# Patient Record
Sex: MALE | Race: ASIAN | NOT HISPANIC OR LATINO | URBAN - METROPOLITAN AREA
[De-identification: names, ages, dates, MRNs, and addresses within clinical notes are randomized per-mention and may not be internally consistent; named-entity substitution may affect disease eponyms.]

---

## 2023-10-12 ENCOUNTER — EMERGENCY (EMERGENCY)
Facility: HOSPITAL | Age: 68
LOS: 1 days | Discharge: HOME | End: 2023-10-12
Attending: STUDENT IN AN ORGANIZED HEALTH CARE EDUCATION/TRAINING PROGRAM
Payer: COMMERCIAL

## 2023-10-12 VITALS
TEMPERATURE: 99 F | HEIGHT: 67 IN | RESPIRATION RATE: 19 BRPM | WEIGHT: 125 LBS | SYSTOLIC BLOOD PRESSURE: 125 MMHG | DIASTOLIC BLOOD PRESSURE: 80 MMHG | HEART RATE: 89 BPM | OXYGEN SATURATION: 96 %

## 2023-10-12 LAB
ALBUMIN SERPL ELPH-MCNC: 4.3 G/DL — SIGNIFICANT CHANGE UP (ref 3.3–5)
ALP SERPL-CCNC: 54 U/L — SIGNIFICANT CHANGE UP (ref 40–120)
ALT FLD-CCNC: 24 U/L — SIGNIFICANT CHANGE UP (ref 10–45)
ANION GAP SERPL CALC-SCNC: 11 MMOL/L — SIGNIFICANT CHANGE UP (ref 5–17)
APTT BLD: 30.3 SEC — SIGNIFICANT CHANGE UP (ref 24.5–35.6)
AST SERPL-CCNC: 19 U/L — SIGNIFICANT CHANGE UP (ref 10–40)
BASOPHILS # BLD AUTO: 0.04 K/UL — SIGNIFICANT CHANGE UP (ref 0–0.2)
BASOPHILS NFR BLD AUTO: 0.8 % — SIGNIFICANT CHANGE UP (ref 0–2)
BILIRUB SERPL-MCNC: 0.3 MG/DL — SIGNIFICANT CHANGE UP (ref 0.2–1.2)
BUN SERPL-MCNC: 18 MG/DL — SIGNIFICANT CHANGE UP (ref 7–23)
CALCIUM SERPL-MCNC: 9.2 MG/DL — SIGNIFICANT CHANGE UP (ref 8.4–10.5)
CHLORIDE SERPL-SCNC: 107 MMOL/L — SIGNIFICANT CHANGE UP (ref 96–108)
CO2 SERPL-SCNC: 23 MMOL/L — SIGNIFICANT CHANGE UP (ref 22–31)
CREAT SERPL-MCNC: 0.84 MG/DL — SIGNIFICANT CHANGE UP (ref 0.5–1.3)
EGFR: 95 ML/MIN/1.73M2 — SIGNIFICANT CHANGE UP
EOSINOPHIL # BLD AUTO: 0.16 K/UL — SIGNIFICANT CHANGE UP (ref 0–0.5)
EOSINOPHIL NFR BLD AUTO: 3.1 % — SIGNIFICANT CHANGE UP (ref 0–6)
GLUCOSE SERPL-MCNC: 105 MG/DL — HIGH (ref 70–99)
HCT VFR BLD CALC: 42.8 % — SIGNIFICANT CHANGE UP (ref 39–50)
HGB BLD-MCNC: 14.4 G/DL — SIGNIFICANT CHANGE UP (ref 13–17)
IMM GRANULOCYTES NFR BLD AUTO: 0.2 % — SIGNIFICANT CHANGE UP (ref 0–0.9)
INR BLD: 1 RATIO — SIGNIFICANT CHANGE UP (ref 0.85–1.18)
LYMPHOCYTES # BLD AUTO: 1.77 K/UL — SIGNIFICANT CHANGE UP (ref 1–3.3)
LYMPHOCYTES # BLD AUTO: 34.8 % — SIGNIFICANT CHANGE UP (ref 13–44)
MCHC RBC-ENTMCNC: 31 PG — SIGNIFICANT CHANGE UP (ref 27–34)
MCHC RBC-ENTMCNC: 33.6 GM/DL — SIGNIFICANT CHANGE UP (ref 32–36)
MCV RBC AUTO: 92.2 FL — SIGNIFICANT CHANGE UP (ref 80–100)
MONOCYTES # BLD AUTO: 0.46 K/UL — SIGNIFICANT CHANGE UP (ref 0–0.9)
MONOCYTES NFR BLD AUTO: 9.1 % — SIGNIFICANT CHANGE UP (ref 2–14)
NEUTROPHILS # BLD AUTO: 2.64 K/UL — SIGNIFICANT CHANGE UP (ref 1.8–7.4)
NEUTROPHILS NFR BLD AUTO: 52 % — SIGNIFICANT CHANGE UP (ref 43–77)
NRBC # BLD: 0 /100 WBCS — SIGNIFICANT CHANGE UP (ref 0–0)
NT-PROBNP SERPL-SCNC: <36 PG/ML — SIGNIFICANT CHANGE UP (ref 0–300)
PLATELET # BLD AUTO: 190 K/UL — SIGNIFICANT CHANGE UP (ref 150–400)
POTASSIUM SERPL-MCNC: 3.8 MMOL/L — SIGNIFICANT CHANGE UP (ref 3.5–5.3)
POTASSIUM SERPL-SCNC: 3.8 MMOL/L — SIGNIFICANT CHANGE UP (ref 3.5–5.3)
PROT SERPL-MCNC: 7 G/DL — SIGNIFICANT CHANGE UP (ref 6–8.3)
PROTHROM AB SERPL-ACNC: 10.5 SEC — SIGNIFICANT CHANGE UP (ref 9.5–13)
RBC # BLD: 4.64 M/UL — SIGNIFICANT CHANGE UP (ref 4.2–5.8)
RBC # FLD: 13.6 % — SIGNIFICANT CHANGE UP (ref 10.3–14.5)
SODIUM SERPL-SCNC: 141 MMOL/L — SIGNIFICANT CHANGE UP (ref 135–145)
TROPONIN T, HIGH SENSITIVITY RESULT: 13 NG/L — SIGNIFICANT CHANGE UP (ref 0–51)
WBC # BLD: 5.08 K/UL — SIGNIFICANT CHANGE UP (ref 3.8–10.5)
WBC # FLD AUTO: 5.08 K/UL — SIGNIFICANT CHANGE UP (ref 3.8–10.5)

## 2023-10-12 PROCEDURE — 99285 EMERGENCY DEPT VISIT HI MDM: CPT

## 2023-10-12 RX ORDER — SODIUM CHLORIDE 9 MG/ML
1000 INJECTION, SOLUTION INTRAVENOUS ONCE
Refills: 0 | Status: COMPLETED | OUTPATIENT
Start: 2023-10-12 | End: 2023-10-12

## 2023-10-12 RX ADMIN — SODIUM CHLORIDE 1000 MILLILITER(S): 9 INJECTION, SOLUTION INTRAVENOUS at 23:57

## 2023-10-12 NOTE — ED PROVIDER NOTE - NEUROLOGICAL SENSATION DIMINISHED
Sensation to deep and light palpation diminished in distal extremities/left upper extremity/left lower extremity/right upper extremity/right lower extremity

## 2023-10-12 NOTE — ED PROVIDER NOTE - RAPID ASSESSMENT
68-year-old male who was recently diagnosed with vertigo outpatient, he with son complaining of 2 days of bilateral distal extremity paresthesias.  As per patient's son Cheo Lloyd, patient has had approximately 2 weeks of room spinning dizziness intermittently occurring associated with change in position, some associated nausea with vomiting.  Patient was evaluated outpatient had carotid duplex and reportedly had MRI brain on 10 9, uncertain of results.  Patient was seen outpatient and as per son told there were potentially "rocks in the ear" and was prescribed meclizine and ondansetron.  2 days ago patient began developing paresthesias distal to the elbows bilaterally and distal to the knees bilaterally.  Patient denies visual change, chest pain, shortness of breath, abdominal pain, weakness.    Patient was seen as a rapid assessment (QPA) patient. The patient will be seen and further worked up in the main emergency department and their care will be completed by the main emergency department team along with a thorough physical exam. Receiving team will follow up on labs, analgesia, any clinical imaging, reassess and disposition as clinically indicated, all decisions regarding the progression of care will be made at their discretion. - Wyatt Headley PA-C

## 2023-10-12 NOTE — ED ADULT TRIAGE NOTE - WEIGHT IN KG
Lm for pt (Praveen Gomez per HIPAA) to further discuss sx. Noted pt in ER. To call back at the office to schedule ER f/u or if any further questions. 56.7

## 2023-10-12 NOTE — ED PROVIDER NOTE - PROGRESS NOTE DETAILS
Neuro evaluation of patient demonstrated bilateral distal paresthesias when sitting up from a resting position. DTR normal across all extremities, no focal deficits noted on neurological exam and strength and coordination intact. Neurology consulted for further assessment of neurological status of patient. Neurology recommended outpatient follow-up. Sent outpatient labs for patient follow-up. Reassessed patient neurological status, unchanged since admission. Patient able to ambulate unassisted with normal gait and normal coordination.

## 2023-10-12 NOTE — ED PROVIDER NOTE - CLINICAL SUMMARY MEDICAL DECISION MAKING FREE TEXT BOX
Patient is a 68 year old man with a history of vertigo presenting with bilateral distal paresthesias on the upper and lower extremities.    DDx includes diabetic neuropathy, stroke, or sensory variant GBS. CBC, CMP, and VBG sent to screen for baseline metabolic and acid/base status. CT head and CTA head and neck sent. Labs and CTs were returned wnl. Neurology was consulted and did not identify any acute pathology requiring emergent intervention and recommended outpatient follow-up. Sent screening labs per neurology recommendations.

## 2023-10-12 NOTE — ED PROVIDER NOTE - OBJECTIVE STATEMENT
The patient is 67yo man presenting with 3 days of bilateral distal extremity paresthesias. The patient is Mandarin speaking and his son, Cheo Lloyd, translated at bedside. The patient was recently diagnosed with vertigo in the outpatient setting and has described two weeks of intermittent dizziness from the room spinning, usually associated with a change in position. This sometimes induced nausea and vomiting. The patient received a carotid duplex with MRI of the brain on 10/9 at [facility] in [place], results unavailable at this time. The patient has been prescribed meclizine and ondansetron to manage his symptoms, but the dizziness is still ongoing. Per the patient's son, 3 days ago the patient began to experience bilateral paresthesias in the upper extremities distal to the elbow and in the lower extremities distal to the knees. The patient notices these changes mostly once he sits up from a laying position.    Patient denied fevers/chills, nausea/vomiting, diarrhea/constipation, changes in vision, chest pain, shortness of breath, abdominal pain, and weakness.

## 2023-10-12 NOTE — ED PROVIDER NOTE - NSFOLLOWUPINSTRUCTIONS_ED_ALL_ED_FT
You visited the hospital due to numbness in your arms and legs. Blood tests were drawn and imaging was collected focused on your nervous system. All of the tests did not show anything acutely wrong with your nervous system. You have been cleared for discharge at this time.    Please follow up with the general [facility] Neurology clinic within 1-2 weeks. The contact information is included below.    Please continue to take your meclizine and ondansetron as prescribed for your ongoing dizziness and follow-up with the Neurology clinic.    See below for information on paresthesias (numbness):    Paresthesia  Paresthesia is a burning or prickling feeling. This feeling can happen in any part of the body. It often happens in the hands, arms, legs, or feet. Usually, it is not painful. In most cases, the feeling goes away in a short time and is not a sign of a serious problem. If you have paresthesia that lasts a long time, you need to see your doctor.    General instructions  Take over-the-counter and prescription medicines only as told by your doctor.  Do not smoke or use any products that contain nicotine or tobacco. If you need help quitting, ask your doctor.  If you have diabetes, work with your doctor to make sure your blood sugar stays in a healthy range.  If your feet feel numb:  Check for redness, warmth, and swelling every day.  Wear padded socks and comfortable shoes. These help protect your feet.  Keep all follow-up visits.    Contact a doctor if:  You have paresthesia that gets worse or does not go away.  You lose feeling (have numbness) after an injury.  Your burning or prickling feeling gets worse when you walk.  You have pain or cramps.  You feel dizzy or you faint.  You have a rash.    Get help right away if:  You feel weak or have new weakness in an arm or leg.  You have trouble walking or moving.  You have problems speaking, understanding, or seeing.  You feel confused.  You cannot control when you pee (urinate) or poop (have a bowel movement).  These symptoms may be an emergency. Get help right away. Call 911.  Do not wait to see if the symptoms will go away.  Do not drive yourself to the hospital.    Summary  Paresthesia is a burning or prickling feeling. It often happens in the hands, arms, legs, or feet.  In most cases, the feeling goes away in a short time and is not a sign of a serious problem.  If you have paresthesia that lasts a long time, you need to be seen by your doctor.

## 2023-10-12 NOTE — ED PROVIDER NOTE - ATTENDING CONTRIBUTION TO CARE
Attending (Tyrese Zuñiga D.O.):  I have personally seen and examined this patient. I have performed a substantive portion of the visit including all aspects of the medical decision making. Resident, fellow, student, and/or ACP note reviewed. I agree on the plan of care except where noted.    68-year-old male with a history of vertigo for the last 2 weeks positional worse with head movements here for distal extremity, bilateral, paresthesias when he sitting up or walking.  Patient states when he sits up he gets from sitting position that improves.  Tongue ENT who told him this was vertigo.  Patient had a brain MRI done at [facility] on October 9, 2023 where he does not have results of yet.  He was told that he has "Roxicet years" by the PCP and that was causing his symptoms.  Patient not taking aspirin or diuretics at this time.  States had COVID recently.  Denies any recent GI illness.  Denies fever, chills.  Still tolerating p.o.  Still able to ambulate without change in strength.  No speech deficits.  Here patient medically stable no acute distress. Patient aaox4 to person, place, time, event. CNs intact w/ symm facies, PERRL 3mm, EOMI w/o nystagmus, normal phonation. 5/5 str all 4 extrem w/ intact sensation to touch however dec when patient sits up in bilateral forearms and shins. 2+ patellar reflex and achilles reflex bilateral. Well coordinated. No drift. Steady gait. 2+ distal pulses, equal b/l. Patient exam with low suspicion of sensory variant of GBS.  Doubt this is primary neurovascular stroke in etiology given recent MRI.  Patient not on medications predisposing to bleeding at this time.  Not on ASA or diuretics that would increase risk of vertigo.  No ear ringing suggest Ménière's. Plan for labs, CTA head and neck, neuro eval -> neuro does not think this is GBS at this time. Will order addl labs and have patient f/u in office with them. All discussed with family. strict return precautions given with verbal understanding expressed. Stable for dc with outpt f/u

## 2023-10-12 NOTE — ED PROVIDER NOTE - NSFOLLOWUPCLINICS_GEN_ALL_ED_FT
[facility]  Neurology  [address] [place] [address] [address]rd [place] [address]  Phone: [phone]  Fax:   Follow Up Time: 7-10 Days

## 2023-10-12 NOTE — ED PROVIDER NOTE - PATIENT PORTAL LINK FT
You can access the FollowMyHealth Patient Portal offered by [facility] by registering at the following website: [email or web address]. By joining [facility]’s FollowMyHealth portal, you will also be able to view your health information using other applications (apps) compatible with our system.

## 2023-10-12 NOTE — ED ADULT TRIAGE NOTE - CHIEF COMPLAINT QUOTE
dizziness and vomiting x 2 wks  exacerbated when getting up, describes as room spinning.   On meclizine and ondansetron.   Received US doppler of carotid arteries and MRI of brain on 10/9 but did not receive results.

## 2023-10-13 VITALS
RESPIRATION RATE: 19 BRPM | SYSTOLIC BLOOD PRESSURE: 118 MMHG | OXYGEN SATURATION: 100 % | DIASTOLIC BLOOD PRESSURE: 80 MMHG | HEART RATE: 70 BPM

## 2023-10-13 LAB
BASE EXCESS BLDV CALC-SCNC: 5 MMOL/L — HIGH (ref -2–3)
CA-I SERPL-SCNC: 1.23 MMOL/L — SIGNIFICANT CHANGE UP (ref 1.15–1.33)
CHLORIDE BLDV-SCNC: 107 MMOL/L — SIGNIFICANT CHANGE UP (ref 96–108)
CO2 BLDV-SCNC: 33 MMOL/L — HIGH (ref 22–26)
CRP SERPL-MCNC: <3 MG/L — SIGNIFICANT CHANGE UP (ref 0–4)
ERYTHROCYTE [SEDIMENTATION RATE] IN BLOOD: 8 MM/HR — SIGNIFICANT CHANGE UP (ref 0–20)
FOLATE SERPL-MCNC: 8.6 NG/ML — SIGNIFICANT CHANGE UP
GAS PNL BLDV: 138 MMOL/L — SIGNIFICANT CHANGE UP (ref 136–145)
GAS PNL BLDV: SIGNIFICANT CHANGE UP
GLUCOSE BLDV-MCNC: 96 MG/DL — SIGNIFICANT CHANGE UP (ref 70–99)
HCO3 BLDV-SCNC: 31 MMOL/L — HIGH (ref 22–29)
HCT VFR BLDA CALC: 39 % — SIGNIFICANT CHANGE UP (ref 39–51)
HGB BLD CALC-MCNC: 13.1 G/DL — SIGNIFICANT CHANGE UP (ref 12.6–17.4)
LACTATE BLDV-MCNC: 1.4 MMOL/L — SIGNIFICANT CHANGE UP (ref 0.5–2)
PCO2 BLDV: 53 MMHG — SIGNIFICANT CHANGE UP (ref 42–55)
PH BLDV: 7.38 — SIGNIFICANT CHANGE UP (ref 7.32–7.43)
PO2 BLDV: 29 MMHG — SIGNIFICANT CHANGE UP (ref 25–45)
POTASSIUM BLDV-SCNC: 4.3 MMOL/L — SIGNIFICANT CHANGE UP (ref 3.5–5.1)
SAO2 % BLDV: 42.4 % — LOW (ref 67–88)
T PALLIDUM AB TITR SER: NEGATIVE — SIGNIFICANT CHANGE UP
T4 FREE+ TSH PNL SERPL: 2.4 UIU/ML — SIGNIFICANT CHANGE UP (ref 0.27–4.2)
TROPONIN T, HIGH SENSITIVITY RESULT: 9 NG/L — SIGNIFICANT CHANGE UP (ref 0–51)
VIT B12 SERPL-MCNC: 359 PG/ML — SIGNIFICANT CHANGE UP (ref 232–1245)

## 2023-10-13 PROCEDURE — 70498 CT ANGIOGRAPHY NECK: CPT | Mod: MA

## 2023-10-13 PROCEDURE — 84132 ASSAY OF SERUM POTASSIUM: CPT

## 2023-10-13 PROCEDURE — 85730 THROMBOPLASTIN TIME PARTIAL: CPT

## 2023-10-13 PROCEDURE — 83921 ORGANIC ACID SINGLE QUANT: CPT

## 2023-10-13 PROCEDURE — 84443 ASSAY THYROID STIM HORMONE: CPT

## 2023-10-13 PROCEDURE — 82947 ASSAY GLUCOSE BLOOD QUANT: CPT

## 2023-10-13 PROCEDURE — 86140 C-REACTIVE PROTEIN: CPT

## 2023-10-13 PROCEDURE — 83605 ASSAY OF LACTIC ACID: CPT

## 2023-10-13 PROCEDURE — 82330 ASSAY OF CALCIUM: CPT

## 2023-10-13 PROCEDURE — 82435 ASSAY OF BLOOD CHLORIDE: CPT

## 2023-10-13 PROCEDURE — 96360 HYDRATION IV INFUSION INIT: CPT | Mod: XU

## 2023-10-13 PROCEDURE — 84425 ASSAY OF VITAMIN B-1: CPT

## 2023-10-13 PROCEDURE — 85652 RBC SED RATE AUTOMATED: CPT

## 2023-10-13 PROCEDURE — 82803 BLOOD GASES ANY COMBINATION: CPT

## 2023-10-13 PROCEDURE — 84207 ASSAY OF VITAMIN B-6: CPT

## 2023-10-13 PROCEDURE — 70498 CT ANGIOGRAPHY NECK: CPT | Mod: 26,MA

## 2023-10-13 PROCEDURE — 70450 CT HEAD/BRAIN W/O DYE: CPT | Mod: 26,MA,XU

## 2023-10-13 PROCEDURE — 84295 ASSAY OF SERUM SODIUM: CPT

## 2023-10-13 PROCEDURE — 36415 COLL VENOUS BLD VENIPUNCTURE: CPT

## 2023-10-13 PROCEDURE — 85025 COMPLETE CBC W/AUTO DIFF WBC: CPT

## 2023-10-13 PROCEDURE — 83880 ASSAY OF NATRIURETIC PEPTIDE: CPT

## 2023-10-13 PROCEDURE — 70496 CT ANGIOGRAPHY HEAD: CPT | Mod: MA

## 2023-10-13 PROCEDURE — 70450 CT HEAD/BRAIN W/O DYE: CPT | Mod: MA

## 2023-10-13 PROCEDURE — 80053 COMPREHEN METABOLIC PANEL: CPT

## 2023-10-13 PROCEDURE — 85014 HEMATOCRIT: CPT

## 2023-10-13 PROCEDURE — 70496 CT ANGIOGRAPHY HEAD: CPT | Mod: 26,MA

## 2023-10-13 PROCEDURE — 86780 TREPONEMA PALLIDUM: CPT

## 2023-10-13 PROCEDURE — 96361 HYDRATE IV INFUSION ADD-ON: CPT

## 2023-10-13 PROCEDURE — 85610 PROTHROMBIN TIME: CPT

## 2023-10-13 PROCEDURE — 82607 VITAMIN B-12: CPT

## 2023-10-13 PROCEDURE — 83036 HEMOGLOBIN GLYCOSYLATED A1C: CPT

## 2023-10-13 PROCEDURE — 99285 EMERGENCY DEPT VISIT HI MDM: CPT | Mod: 25

## 2023-10-13 PROCEDURE — 93005 ELECTROCARDIOGRAM TRACING: CPT

## 2023-10-13 PROCEDURE — 85018 HEMOGLOBIN: CPT

## 2023-10-13 PROCEDURE — 84484 ASSAY OF TROPONIN QUANT: CPT

## 2023-10-13 PROCEDURE — 82746 ASSAY OF FOLIC ACID SERUM: CPT

## 2023-10-13 RX ADMIN — SODIUM CHLORIDE 1000 MILLILITER(S): 9 INJECTION, SOLUTION INTRAVENOUS at 03:07

## 2023-10-13 NOTE — CONSULT NOTE ADULT - NSCONSULTADDITIONALINFOA_GEN_ALL_CORE
I was on-call attending and resident discussed the case with me over the phone. Patient was discharged from emergency department. Thank you.  Angel Palacios MD

## 2023-10-13 NOTE — CONSULT NOTE ADULT - ASSESSMENT
68M PMHx BPPV and COVID (9/2023) presenting with bilateral lower extremity numbness x3 days and possibly subjective numbness of hands. Sudden onset without triggering event. No other associated symptoms. Symptoms have been stable x3 days since onset. Vitals stable. CBC and CMP WNL. Pending neuroimaging.     IMPRESSION: Non-progressing parasthesia of bilateral lower extremities from feet to mid-shin region (stocking distribution) w/o associated weakness or asymmetric/decreased reflexes likely 2/2 toxic-metabolic etiology vs. less likely AIDP. Low suspicion for radiculopathic or myelopathic etiology.     RECOMMENDATIONS:  [] Follow-up CTH and CTA H/N final read  [] Check B1, B6, B12, Folate, Methylmalonic Acid, HbA1c, TSH w/ reflex T4, RPR, ESR, CRP  [] Patient can follow up with general neurology at [address] [place] [address] 1-2 weeks after discharge. Please instruct the patient to call [phone] to schedule this appointment.  [] Neurochecks and vitals per routine  [] Rest of care per primary team      Case to be seen and d/w Neurology attending.   68M PMHx BPPV and COVID (9/2023) presenting with bilateral lower extremity numbness x3 days and possibly subjective numbness of hands. Sudden onset without triggering event. No other associated symptoms. Symptoms have been stable x3 days since onset. Vitals stable. CBC and CMP WNL. CTH w/o acute findings and CTA w/o flow-limiting stenosis.     IMPRESSION: Non-progressing parasthesia of bilateral lower extremities from feet to mid-shin region (stocking distribution) w/o associated weakness or asymmetric/decreased reflexes likely 2/2 toxic-metabolic etiology vs. less likely AIDP. Low suspicion for radiculopathic or myelopathic etiology.     RECOMMENDATIONS:  [] No further neuroimaging indicated at this time  [] Check B1, B6, B12, Folate, Methylmalonic Acid, HbA1c, TSH w/ reflex T4, RPR, ESR, CRP  [] Patient can follow up with general neurology at [address] [place] [address] 1-2 weeks after discharge for EMG. Please instruct the patient to call [phone] to schedule this appointment.  [] Neurochecks and vitals per routine  [] Rest of care per primary team      Case d/w Neurology attending.

## 2023-10-13 NOTE — CONSULT NOTE ADULT - SUBJECTIVE AND OBJECTIVE BOX
Neurology - Consult Note    Spectra: [phone] ([facility]), [phone] ([facility])    HPI:  68M PMHx BPPV and COVID (9/2023) presenting with bilateral lower and upper extremity numbness. Per son, via phone, approximately 3 days prior, he had sudden onset of bilateral "hand to elbow and feet to knee" numbness. No triggering event. Pt denies any other symptoms including weakness during this time. Pt states it has been stable seen 3 days prior but persisted therefore prompting ED visit. Of note, pt recently came back from a cruise at the end of August and became COVID positive a week after. In addition, in the middle of September, pt began feeling "dizzy where room was spinning" and had to see a PCP who diagnosed him with BPPV. Pt took Meclizine and Zofran which improved the symptoms. Pt also underwent a MRI brain on 10/9 and carotid duplex for unclear reasons and reportedly, everything was normal. Pt currently denies neck pain, back pain or radiating pain in addition to ROS as noted below.      Review of Systems:  CONSTITUTIONAL: No fevers or chills  EYES AND ENT: No visual changes or no throat pain   NECK: No pain or stiffness  RESPIRATORY: No shortness of breath  CARDIOVASCULAR: No chest pain or palpitations  GASTROINTESTINAL: No nausea or vomiting   GENITOURINARY: No dysuria  NEUROLOGICAL: +As stated in HPI above  SKIN: No itching, burning, rashes, or lesions   All other review of systems is negative unless indicated above.    Allergies:  No Known Allergies    PMHx/PSHx/Family Hx: As above, otherwise see below   Vertigo    Social Hx:  No current use of tobacco, alcohol (quit many years ago), or illicit drugs  Lives with wife and son at home. Retired.     Medications:  MEDICATIONS  (STANDING):    MEDICATIONS  (PRN):      Vitals:  T(C): 36.9 (10-12-23 @ 23:50), Max: 37.1 (10-12-23 @ 19:56)  HR: 66 (10-12-23 @ 23:50) (66 - 89)  BP: 120/70 (10-12-23 @ 23:50) (120/70 - 125/80)  RR: 20 (10-12-23 @ 23:50) (19 - 20)  SpO2: 99% (10-12-23 @ 23:50) (96% - 99%)    Physical Examination:  General - non-toxic appearing male in no acute distress  Cardiovascular - peripheral pulses palpable, no edema  Respiratory - breathing comfortably with no increased work of breathing    Neurologic Exam:  Mental status - Awake, Alert, Oriented to person, place, and time. Speech fluent, repetition and naming intact. Follows simple and complex commands. Attention/concentration, recent and remote memory (including registration 3/3 and recall 3/3) intact    Cranial nerves - PERRL (5mm -> 3mm b/l), VFF, EOMI, face sensation (V1-V3) intact b/l, facial strength intact without asymmetry b/l, hearing intact b/l, palate with symmetric elevation, trapezius 5/5 strength b/l, tongue midline on protrusion with full lateral movement    Motor - Normal bulk and tone throughout. No pronator drift.    Strength testing            Deltoid      Biceps      Triceps     Wrist Extension    Wrist Flexion     Interossei         R            4+            5               5                5                        5                   5                 5  L             5              5               5                5                        5                   5                 5              Hip Flexion    Hip Extension    Knee Flexion    Knee Extension    Dorsiflexion    Plantar Flexion  R              5                  5                       5                    5                        5                          5  L              5                  5                        5                     5                        5                          5    Sensation - Decreased sensation to light touch and pinprick from dorsal and plantar surface of both feet up to mid shin bilaterally. No sensation difference in upper extremities to light touch and pinprick. Not dermatomal distribution.     DTR's - pectoralis reflex elicited.              Biceps      Triceps     Brachioradialis      Patellar    Ankle    Toes/plantar response  R            3+            3+               3+                  2+          2+                 Down  L             3+            3+               3+                 2+           2+                 Down    Coordination - Finger to Nose intact b/l. Heel to levy intact b/l. No tremors appreciated    Gait and station - Normal casual gait. Romberg (-)    Labs:                        14.4   5.08  )-----------( 190      ( 12 Oct 2023 20:40 )             42.8     10-12    141  |  107  |  18  ----------------------------<  105<H>  3.8   |  23  |  0.84    Ca    9.2      12 Oct 2023 20:40    TPro  7.0  /  Alb  4.3  /  TBili  0.3  /  DBili  x   /  AST  19  /  ALT  24  /  AlkPhos  54  10-12    CAPILLARY BLOOD GLUCOSE      LIVER FUNCTIONS - ( 12 Oct 2023 20:40 )  Alb: 4.3 g/dL / Pro: 7.0 g/dL / ALK PHOS: 54 U/L / ALT: 24 U/L / AST: 19 U/L / GGT: x           PT/INR - ( 12 Oct 2023 20:40 )   PT: 10.5 sec;   INR: 1.00 ratio       PTT - ( 12 Oct 2023 20:40 )  PTT:30.3 sec      Radiology:     Neurology - Consult Note    Spectra: [phone] ([facility]), [phone] ([facility])    HPI:  68M PMHx BPPV and COVID (9/2023) presenting with bilateral lower and upper extremity numbness. Per son, via phone, approximately 3 days prior, he had sudden onset of bilateral "hand to elbow and feet to knee" numbness. No triggering event. Pt denies any other symptoms including weakness during this time. Pt states it has been stable seen 3 days prior but persisted therefore prompting ED visit. Of note, pt recently came back from a cruise at the end of August and became COVID positive a week after. In addition, in the middle of September, pt began feeling "dizzy where room was spinning" and had to see a PCP who diagnosed him with BPPV. Pt took Meclizine and Zofran which improved the symptoms. Pt also underwent a MRI brain on 10/9 and carotid duplex for unclear reasons and reportedly, everything was normal. Pt currently denies neck pain, back pain or radiating pain in addition to ROS as noted below.      Review of Systems:  CONSTITUTIONAL: No fevers or chills  EYES AND ENT: No visual changes or no throat pain   NECK: No pain or stiffness  RESPIRATORY: No shortness of breath  CARDIOVASCULAR: No chest pain or palpitations  GASTROINTESTINAL: No nausea or vomiting   GENITOURINARY: No dysuria  NEUROLOGICAL: +As stated in HPI above  SKIN: No itching, burning, rashes, or lesions   All other review of systems is negative unless indicated above.    Allergies:  No Known Allergies    PMHx/PSHx/Family Hx: As above, otherwise see below   Vertigo    Social Hx:  No current use of tobacco, alcohol (quit many years ago), or illicit drugs  Lives with wife and son at home. Retired.     Medications:  MEDICATIONS  (STANDING):    MEDICATIONS  (PRN):      Vitals:  T(C): 36.9 (10-12-23 @ 23:50), Max: 37.1 (10-12-23 @ 19:56)  HR: 66 (10-12-23 @ 23:50) (66 - 89)  BP: 120/70 (10-12-23 @ 23:50) (120/70 - 125/80)  RR: 20 (10-12-23 @ 23:50) (19 - 20)  SpO2: 99% (10-12-23 @ 23:50) (96% - 99%)    Physical Examination:  General - non-toxic appearing male in no acute distress  Cardiovascular - peripheral pulses palpable, no edema  Respiratory - breathing comfortably with no increased work of breathing    Neurologic Exam:  Mental status - Awake, Alert, Oriented to person, place, and time. Speech fluent, repetition and naming intact. Follows simple and complex commands. Attention/concentration, recent and remote memory (including registration 3/3 and recall 3/3) intact    Cranial nerves - PERRL (5mm -> 3mm b/l), VFF, EOMI, face sensation (V1-V3) intact b/l, facial strength intact without asymmetry b/l, hearing intact b/l, palate with symmetric elevation, trapezius 5/5 strength b/l, tongue midline on protrusion with full lateral movement    Motor - Normal bulk and tone throughout. No pronator drift.    Strength testing            Deltoid      Biceps      Triceps     Wrist Extension    Wrist Flexion     Interossei         R            4+            5               5                5                        5                   5                 5  L             5              5               5                5                        5                   5                 5              Hip Flexion    Hip Extension    Knee Flexion    Knee Extension    Dorsiflexion    Plantar Flexion  R              5                  5                       5                    5                        5                          5  L              5                  5                        5                     5                        5                          5    Sensation - Decreased sensation to light touch and pinprick from dorsal and plantar surface of both feet up to mid shin bilaterally. No sensation difference in upper extremities to light touch and pinprick. Not dermatomal distribution.     DTR's - pectoralis reflex elicited.              Biceps      Triceps     Brachioradialis      Patellar    Ankle    Toes/plantar response  R            3+            3+               3+                  2+          2+                 Down  L             3+            3+               3+                 2+           2+                 Down    Coordination - Finger to Nose intact b/l. Heel to levy intact b/l. No tremors appreciated    Gait and station - Normal casual gait. Romberg (-)    Labs:                        14.4   5.08  )-----------( 190      ( 12 Oct 2023 20:40 )             42.8     10-12    141  |  107  |  18  ----------------------------<  105<H>  3.8   |  23  |  0.84    Ca    9.2      12 Oct 2023 20:40    TPro  7.0  /  Alb  4.3  /  TBili  0.3  /  DBili  x   /  AST  19  /  ALT  24  /  AlkPhos  54  10-12    CAPILLARY BLOOD GLUCOSE      LIVER FUNCTIONS - ( 12 Oct 2023 20:40 )  Alb: 4.3 g/dL / Pro: 7.0 g/dL / ALK PHOS: 54 U/L / ALT: 24 U/L / AST: 19 U/L / GGT: x           PT/INR - ( 12 Oct 2023 20:40 )   PT: 10.5 sec;   INR: 1.00 ratio       PTT - ( 12 Oct 2023 20:40 )  PTT:30.3 sec      Radiology:  < from: CT Angio Neck w/ IV Cont (10.13.23 @ 01:23) >  IMPRESSION:  CT head: No acute intracranial hemorrhage or mass effect. Mild prominence   of the lateral and third ventricleswith normal sized fourth ventricle,   no gross obstructing mass identified at the level of the cerebral   aqueduct. Further workup with contrast-enhanced brain MRI is suggested.    CTA neck: No hemodynamically significant stenosis, dissection, or   pseudoaneurysm.    CTA head: No large vessel occlusion, significant stenosis, dissection, or   saccular aneurysm.    < end of copied text >

## 2023-10-13 NOTE — ED ADULT NURSE NOTE - NSFALLRISKINTERV_ED_ALL_ED
Assistance OOB with selected safe patient handling equipment if applicable/Assistance with ambulation/Communicate fall risk and risk factors to all staff, patient, and family/Monitor gait and stability/Provide visual cue: yellow wristband, yellow gown, etc/Reinforce activity limits and safety measures with patient and family/Call bell, personal items and telephone in reach/Instruct patient to call for assistance before getting out of bed/chair/stretcher/Non-slip footwear applied when patient is off stretcher/[place] to call system/Physically safe environment - no spills, clutter or unnecessary equipment/Purposeful Proactive Rounding/Room/bathroom lighting operational, light cord in reach

## 2023-10-13 NOTE — ED ADULT NURSE NOTE - OBJECTIVE STATEMENT
67 y/o Axox4 M arrived from home complaining of dizziness. Pt Mandarin speaking son to translate at bedside. Pt endorses 2 wks of 'room spinning' and dizziness worsens when changing positions, also a/w intermittent nausea/vomiting. Pt got MRI brain done 10/9 did not get results, however prescribed meclizine and zofran for PRN symptoms for vertigo by outpatient doctor. Pt endorses 2 days of paresthesias in b/l hands. Pt denies blurry vision, photophobia, HA.

## 2023-10-17 LAB
METHYLMALONATE SERPL-SCNC: 62 NMOL/L — SIGNIFICANT CHANGE UP (ref 0–378)
METHYLMALONATE SERPL-SCNC: 62 NMOL/L — SIGNIFICANT CHANGE UP (ref 0–378)
VIT B1 SERPL-MCNC: 101.1 NMOL/L — SIGNIFICANT CHANGE UP (ref 66.5–200)
VIT B1 SERPL-MCNC: 101.1 NMOL/L — SIGNIFICANT CHANGE UP (ref 66.5–200)

## 2023-10-20 LAB
PYRIDOXAL PHOS SERPL-MCNC: SIGNIFICANT CHANGE UP UG/L
PYRIDOXAL PHOS SERPL-MCNC: SIGNIFICANT CHANGE UP UG/L

## 2023-10-20 NOTE — ED POST DISCHARGE NOTE - DETAILS
10/20/23 [facility] BLUE-via Mandarin  #[id number], called patient to inform them of result, no answer, left v/m to call back admin line

## 2024-12-23 ENCOUNTER — EMERGENCY (EMERGENCY)
Facility: HOSPITAL | Age: 69
LOS: 1 days | Discharge: HOME | End: 2024-12-23
Attending: STUDENT IN AN ORGANIZED HEALTH CARE EDUCATION/TRAINING PROGRAM
Payer: COMMERCIAL

## 2024-12-23 VITALS
SYSTOLIC BLOOD PRESSURE: 129 MMHG | DIASTOLIC BLOOD PRESSURE: 80 MMHG | RESPIRATION RATE: 20 BRPM | OXYGEN SATURATION: 96 % | TEMPERATURE: 99 F | HEART RATE: 89 BPM

## 2024-12-23 PROCEDURE — 99284 EMERGENCY DEPT VISIT MOD MDM: CPT | Mod: 25

## 2024-12-24 VITALS
RESPIRATION RATE: 17 BRPM | DIASTOLIC BLOOD PRESSURE: 73 MMHG | SYSTOLIC BLOOD PRESSURE: 119 MMHG | HEART RATE: 83 BPM | OXYGEN SATURATION: 96 % | TEMPERATURE: 98 F

## 2024-12-24 PROBLEM — R42 DIZZINESS AND GIDDINESS: Chronic | Status: ACTIVE | Noted: 2023-10-12

## 2024-12-24 LAB
ALBUMIN SERPL ELPH-MCNC: 4.3 G/DL — SIGNIFICANT CHANGE UP (ref 3.3–5)
ALP SERPL-CCNC: 57 U/L — SIGNIFICANT CHANGE UP (ref 40–120)
ALT FLD-CCNC: 27 U/L — SIGNIFICANT CHANGE UP (ref 10–45)
ANION GAP SERPL CALC-SCNC: 11 MMOL/L — SIGNIFICANT CHANGE UP (ref 5–17)
APPEARANCE UR: CLEAR — SIGNIFICANT CHANGE UP
AST SERPL-CCNC: 21 U/L — SIGNIFICANT CHANGE UP (ref 10–40)
BACTERIA # UR AUTO: NEGATIVE /HPF — SIGNIFICANT CHANGE UP
BASOPHILS # BLD AUTO: 0.06 K/UL — SIGNIFICANT CHANGE UP (ref 0–0.2)
BASOPHILS NFR BLD AUTO: 0.9 % — SIGNIFICANT CHANGE UP (ref 0–2)
BILIRUB SERPL-MCNC: 0.6 MG/DL — SIGNIFICANT CHANGE UP (ref 0.2–1.2)
BILIRUB UR-MCNC: NEGATIVE — SIGNIFICANT CHANGE UP
BUN SERPL-MCNC: 15 MG/DL — SIGNIFICANT CHANGE UP (ref 7–23)
CALCIUM SERPL-MCNC: 9.1 MG/DL — SIGNIFICANT CHANGE UP (ref 8.4–10.5)
CAST: 0 /LPF — SIGNIFICANT CHANGE UP (ref 0–4)
CHLORIDE SERPL-SCNC: 103 MMOL/L — SIGNIFICANT CHANGE UP (ref 96–108)
CO2 SERPL-SCNC: 26 MMOL/L — SIGNIFICANT CHANGE UP (ref 22–31)
COLOR SPEC: YELLOW — SIGNIFICANT CHANGE UP
CREAT SERPL-MCNC: 0.91 MG/DL — SIGNIFICANT CHANGE UP (ref 0.5–1.3)
DIFF PNL FLD: ABNORMAL
EGFR: 91 ML/MIN/1.73M2 — SIGNIFICANT CHANGE UP
EOSINOPHIL # BLD AUTO: 0.11 K/UL — SIGNIFICANT CHANGE UP (ref 0–0.5)
EOSINOPHIL NFR BLD AUTO: 1.8 % — SIGNIFICANT CHANGE UP (ref 0–6)
FLUAV AG NPH QL: DETECTED
FLUBV AG NPH QL: SIGNIFICANT CHANGE UP
GAS PNL BLDV: SIGNIFICANT CHANGE UP
GIANT PLATELETS BLD QL SMEAR: PRESENT — SIGNIFICANT CHANGE UP
GLUCOSE SERPL-MCNC: 126 MG/DL — HIGH (ref 70–99)
GLUCOSE UR QL: NEGATIVE MG/DL — SIGNIFICANT CHANGE UP
HCT VFR BLD CALC: 43.2 % — SIGNIFICANT CHANGE UP (ref 39–50)
HGB BLD-MCNC: 14.6 G/DL — SIGNIFICANT CHANGE UP (ref 13–17)
KETONES UR-MCNC: NEGATIVE MG/DL — SIGNIFICANT CHANGE UP
LEUKOCYTE ESTERASE UR-ACNC: NEGATIVE — SIGNIFICANT CHANGE UP
LYMPHOCYTES # BLD AUTO: 0.34 K/UL — LOW (ref 1–3.3)
LYMPHOCYTES # BLD AUTO: 5.5 % — LOW (ref 13–44)
MAGNESIUM SERPL-MCNC: 2.1 MG/DL — SIGNIFICANT CHANGE UP (ref 1.6–2.6)
MANUAL SMEAR VERIFICATION: SIGNIFICANT CHANGE UP
MCHC RBC-ENTMCNC: 31.3 PG — SIGNIFICANT CHANGE UP (ref 27–34)
MCHC RBC-ENTMCNC: 33.8 G/DL — SIGNIFICANT CHANGE UP (ref 32–36)
MCV RBC AUTO: 92.5 FL — SIGNIFICANT CHANGE UP (ref 80–100)
METAMYELOCYTES # FLD: 0.9 % — HIGH (ref 0–0)
MONOCYTES # BLD AUTO: 0.22 K/UL — SIGNIFICANT CHANGE UP (ref 0–0.9)
MONOCYTES NFR BLD AUTO: 3.6 % — SIGNIFICANT CHANGE UP (ref 2–14)
NEUTROPHILS # BLD AUTO: 5.41 K/UL — SIGNIFICANT CHANGE UP (ref 1.8–7.4)
NEUTROPHILS NFR BLD AUTO: 87.3 % — HIGH (ref 43–77)
NITRITE UR-MCNC: NEGATIVE — SIGNIFICANT CHANGE UP
NRBC # BLD: 1 /100 WBCS — HIGH (ref 0–0)
NT-PROBNP SERPL-SCNC: 50 PG/ML — SIGNIFICANT CHANGE UP (ref 0–300)
PH UR: 6.5 — SIGNIFICANT CHANGE UP (ref 5–8)
PLAT MORPH BLD: NORMAL — SIGNIFICANT CHANGE UP
PLATELET # BLD AUTO: 154 K/UL — SIGNIFICANT CHANGE UP (ref 150–400)
POTASSIUM SERPL-MCNC: 4.1 MMOL/L — SIGNIFICANT CHANGE UP (ref 3.5–5.3)
POTASSIUM SERPL-SCNC: 4.1 MMOL/L — SIGNIFICANT CHANGE UP (ref 3.5–5.3)
PROT SERPL-MCNC: 7.4 G/DL — SIGNIFICANT CHANGE UP (ref 6–8.3)
PROT UR-MCNC: NEGATIVE MG/DL — SIGNIFICANT CHANGE UP
RBC # BLD: 4.67 M/UL — SIGNIFICANT CHANGE UP (ref 4.2–5.8)
RBC # FLD: 13.2 % — SIGNIFICANT CHANGE UP (ref 10.3–14.5)
RBC BLD AUTO: NORMAL — SIGNIFICANT CHANGE UP
RBC CASTS # UR COMP ASSIST: 1 /HPF — SIGNIFICANT CHANGE UP (ref 0–4)
REVIEW: SIGNIFICANT CHANGE UP
RSV RNA NPH QL NAA+NON-PROBE: SIGNIFICANT CHANGE UP
SARS-COV-2 RNA SPEC QL NAA+PROBE: SIGNIFICANT CHANGE UP
SODIUM SERPL-SCNC: 140 MMOL/L — SIGNIFICANT CHANGE UP (ref 135–145)
SP GR SPEC: 1 — SIGNIFICANT CHANGE UP (ref 1–1.03)
SQUAMOUS # UR AUTO: 0 /HPF — SIGNIFICANT CHANGE UP (ref 0–5)
TROPONIN T, HIGH SENSITIVITY RESULT: 13 NG/L — SIGNIFICANT CHANGE UP (ref 0–51)
TROPONIN T, HIGH SENSITIVITY RESULT: 18 NG/L — SIGNIFICANT CHANGE UP (ref 0–51)
UROBILINOGEN FLD QL: 0.2 MG/DL — SIGNIFICANT CHANGE UP (ref 0.2–1)
WBC # BLD: 6.2 K/UL — SIGNIFICANT CHANGE UP (ref 3.8–10.5)
WBC # FLD AUTO: 6.2 K/UL — SIGNIFICANT CHANGE UP (ref 3.8–10.5)
WBC UR QL: 1 /HPF — SIGNIFICANT CHANGE UP (ref 0–5)

## 2024-12-24 PROCEDURE — 83880 ASSAY OF NATRIURETIC PEPTIDE: CPT

## 2024-12-24 PROCEDURE — 82435 ASSAY OF BLOOD CHLORIDE: CPT

## 2024-12-24 PROCEDURE — 85014 HEMATOCRIT: CPT

## 2024-12-24 PROCEDURE — 84484 ASSAY OF TROPONIN QUANT: CPT

## 2024-12-24 PROCEDURE — 80053 COMPREHEN METABOLIC PANEL: CPT

## 2024-12-24 PROCEDURE — 83735 ASSAY OF MAGNESIUM: CPT

## 2024-12-24 PROCEDURE — 99285 EMERGENCY DEPT VISIT HI MDM: CPT | Mod: 25

## 2024-12-24 PROCEDURE — 71046 X-RAY EXAM CHEST 2 VIEWS: CPT | Mod: 26

## 2024-12-24 PROCEDURE — 83605 ASSAY OF LACTIC ACID: CPT

## 2024-12-24 PROCEDURE — 84132 ASSAY OF SERUM POTASSIUM: CPT

## 2024-12-24 PROCEDURE — 87086 URINE CULTURE/COLONY COUNT: CPT

## 2024-12-24 PROCEDURE — 85025 COMPLETE CBC W/AUTO DIFF WBC: CPT

## 2024-12-24 PROCEDURE — 84295 ASSAY OF SERUM SODIUM: CPT

## 2024-12-24 PROCEDURE — 87637 SARSCOV2&INF A&B&RSV AMP PRB: CPT

## 2024-12-24 PROCEDURE — 96374 THER/PROPH/DIAG INJ IV PUSH: CPT

## 2024-12-24 PROCEDURE — 82803 BLOOD GASES ANY COMBINATION: CPT

## 2024-12-24 PROCEDURE — 81001 URINALYSIS AUTO W/SCOPE: CPT

## 2024-12-24 PROCEDURE — 82330 ASSAY OF CALCIUM: CPT

## 2024-12-24 PROCEDURE — 85018 HEMOGLOBIN: CPT

## 2024-12-24 PROCEDURE — 82947 ASSAY GLUCOSE BLOOD QUANT: CPT

## 2024-12-24 PROCEDURE — 71046 X-RAY EXAM CHEST 2 VIEWS: CPT

## 2024-12-24 PROCEDURE — 93005 ELECTROCARDIOGRAM TRACING: CPT

## 2024-12-24 RX ORDER — ACETAMINOPHEN 500MG 500 MG/1
1000 TABLET, COATED ORAL ONCE
Refills: 0 | Status: COMPLETED | OUTPATIENT
Start: 2024-12-24 | End: 2024-12-24

## 2024-12-24 RX ORDER — SODIUM CHLORIDE 9 MG/ML
1000 INJECTION, SOLUTION INTRAMUSCULAR; INTRAVENOUS; SUBCUTANEOUS ONCE
Refills: 0 | Status: COMPLETED | OUTPATIENT
Start: 2024-12-24 | End: 2024-12-24

## 2024-12-24 RX ADMIN — ACETAMINOPHEN 500MG 400 MILLIGRAM(S): 500 TABLET, COATED ORAL at 00:40

## 2024-12-24 RX ADMIN — SODIUM CHLORIDE 1000 MILLILITER(S): 9 INJECTION, SOLUTION INTRAMUSCULAR; INTRAVENOUS; SUBCUTANEOUS at 00:40

## 2024-12-24 NOTE — ED PROVIDER NOTE - NSFOLLOWUPINSTRUCTIONS_ED_ALL_ED_FT
You were seen in the Emergency Department for cough, generalized weakness.  Your evaluation today shows your symptoms are not from any dangerous or life-threatening causes.  You were found to have the flu which is a viral infection that can take up to 7-10 days to resolve.  At home, you can take acetaminophen and/or ibuprofen as needed for pain.  Drink plenty of fluids at home.      1) Continue all previously prescribed medications as directed.    2) Follow up with your primary care physician - take copies of your results.    3) Return to the Emergency Department for worsening or persistent symptoms, and/or ANY NEW OR CONCERNING SYMPTOMS.     Upper Respiratory Infection, Adult  An upper respiratory infection (URI) affects the nose, throat, and upper airways that lead to the lungs. The most common type of URI is often called the common cold. URIs usually get better on their own, without medical treatment.    What are the causes?  A URI is caused by a germ (virus). You may catch these germs by:  Breathing in droplets from an infected person's cough or sneeze.  Touching something that has the germ on it (is contaminated) and then touching your mouth, nose, or eyes.    What increases the risk?  You are more likely to get a URI if:  You are very young or very old.  You have close contact with others, such as at work, school, or a health care facility.  You smoke.  You have long-term (chronic) heart or lung disease.  You have a weakened disease-fighting system (immune system).  You have nasal allergies or asthma.  You have a lot of stress.  You have poor nutrition.    What are the signs or symptoms?  Runny or stuffy (congested) nose.  Cough.  Sneezing.  Sore throat.  Headache.  Feeling tired (fatigue).  Fever.  Not wanting to eat as much as usual.  Pain in your forehead, behind your eyes, and over your cheekbones (sinus pain).  Muscle aches.  Redness or irritation of the eyes.  Pressure in the ears or face.    How is this treated?  URIs usually get better on their own within 7–10 days. Medicines cannot cure URIs, but your doctor may recommend certain medicines to help relieve symptoms, such as:  Over-the-counter cold medicines.  Medicines to reduce coughing (cough suppressants). Coughing is a type of defense against infection that helps to clear the nose, throat, windpipe, and lungs (respiratory system). Take these medicines only as told by your doctor.  Medicines to lower your fever.    Follow these instructions at home:  Activity  Rest as needed.  If you have a fever, stay home from work or school until your fever is gone, or until your doctor says you may return to work or school.  You should stay home until you cannot spread the infection anymore (you are not contagious).  Your doctor may have you wear a face mask so you have less risk of spreading the infection.    Relieving symptoms  Rinse your mouth often with salt water. To make salt water, dissolve ½–1 tsp (3–6 g) of salt in 1 cup (237 mL) of warm water.  Use a cool-mist humidifier to add moisture to the air. This can help you breathe more easily.    Eating and drinking  Drink enough fluid to keep your pee (urine) pale yellow.  Eat soups and other clear broths.    General instructions  Take over-the-counter and prescription medicines only as told by your doctor.  Do not smoke or use any products that contain nicotine or tobacco. If you need help quitting, ask your doctor.  Avoid being where people are smoking (avoid secondhand smoke).  Stay up to date on all your shots (immunizations), and get the flu shot every year.  Keep all follow-up visits.    How to prevent the spread of infection to others  Washing hands with soap and water.  Wash your hands with soap and water for at least 20 seconds. If you cannot use soap and water, use hand .  Avoid touching your mouth, face, eyes, or nose.  Cough or sneeze into a tissue or your sleeve or elbow. Do not cough or sneeze into your hand or into the air.    Contact a doctor if:  You are getting worse, not better.  You have any of these:  A fever or chills.  Brown or red mucus in your nose.  Yellow or brown fluid (discharge)coming from your nose.  Pain in your face, especially when you bend forward.  Swollen neck glands.  Pain when you swallow.  White areas in the back of your throat.    Get help right away if:  You have shortness of breath that gets worse.  You have very bad or constant:  -Headache.  -Ear pain.  -Pain in your forehead, behind your eyes, and over your cheekbones (sinus pain).  -Chest pain.  You have long-lasting (chronic) lung disease along with any of these:  -Making high-pitched whistling sounds when you breathe, most often when you breathe out (wheezing).  -Long-lasting cough (more than 14 days).  -Coughing up blood.  -A change in your usual mucus.  You have a stiff neck.  You have changes in your:  -Vision.  -Hearing.  -Thinking.  -Mood.  These symptoms may be an emergency. Get help right away. Call 911.  Do not wait to see if the symptoms will go away.  Do not drive yourself to the hospital.    Summary  An upper respiratory infection (URI) is caused by a germ (virus). The most common type of URI is often called the common cold.  URIs usually get better within 7–10 days.  Take over-the-counter and prescription medicines only as told by your doctor

## 2024-12-24 NOTE — ED PROVIDER NOTE - PROGRESS NOTE DETAILS
Odin PGY2: workup remarkable for influenza.  on re-eval patient feeling improved.  CP now resolved.  Symptoms likely 2/2 viral infection.  Will dc with PCP follow up.  patient and family updated on results and plan, and are agreable.

## 2024-12-24 NOTE — ED PROVIDER NOTE - CLINICAL SUMMARY MEDICAL DECISION MAKING FREE TEXT BOX
69-year male, history of vertigo, prostate issue, presents to ED with few days of cough, congestion, generalized weakness.  Today had an episode of lightheadedness while sitting on the couch with episode of vomiting.  Patient reports he felt much improved after vomiting.  Sick contact at home with family members have tested positive for flu and COVID.  Patient also reporting diffuse chest pain ongoing for the past month.  No exacerbating or relieving factors.  Has not seen a cardiologist for many years.  Vital sign stable.  Patient well-appearing, no acute distress, heart regular rate and rhythm, lungs clear, abdomen soft and nontender, no gross motor or sensory deficits.  Symptoms likely secondary to viral infection.  Given chest pain will assess for ACS, arrhythmia.  Dispo pending workup.

## 2024-12-24 NOTE — ED PROVIDER NOTE - ATTENDING CONTRIBUTION TO CARE
swelling to right cheek/SWELLING OF FACE, TONGUE Matt Hernández DO: I have personally performed a face to face medical and diagnostic evaluation of the patient. I have discussed with and reviewed the Resident's and/or ACP's and/or Medical/PA/NP student's note and agree with the History, ROS, Physical Exam and MDM unless otherwise indicated. A brief summary of my personal evaluation and impression can be found below.     HPI above    CONSTITUTIONAL: NAD  SKIN: Warm dry, normal skin turgor  HEAD: NCAT  EYES: EOMI, PERRLA, no scleral icterus, conjunctiva pink  NECK: Supple; non tender. Full ROM.  CARD: RRR  RESP: No respiratory distress  ABD: non-distended, no abdominal tenderness  MSK: Full ROM, no leg swelling  PSYCH: Cooperative, appropriate.    Patient having chest pain symptoms of URI probable viral illness suspect flu especially as patient's had recent sick contacts at home.  Suspect chest pain is not ACS possible demand ischemia in the setting of URI/flu, will get chest pain workup in addition to infectious workup give symptomatic control and reassess.  Patient has normal EKG, troponins are not elevated, patient has low risk heart score and scores points only for age, no concerning story no family history no other risk factors.  Left shared decision made with patient about cardiac workup and troponins and EKG although at this time will favor discharge home.  Dispo pending labs and response to symptomatic control.

## 2024-12-24 NOTE — ED PROVIDER NOTE - CARE PLAN
Principal Discharge DX:	Generalized weakness  Secondary Diagnosis:	Nausea & vomiting  Secondary Diagnosis:	Chest pain   1

## 2024-12-24 NOTE — ED ADULT NURSE NOTE - OBJECTIVE STATEMENT
69y M A&Ox4 c/o of flu like symptoms. Pt son at bedside states his children and wife at home 69y M A&Ox4 BIBEMS for  flu like symptoms. As per EMS pt began feeling tired and weak [id number] yesterday and has positive sick contacts at home. Pt son at bedside states his children and wife are sick at home with COvid and Flu. Pt states he feels weak and tired. Additionally pt states he has had on and off sharp chest pain all over his chest for about a month.  Pt also felt nauseous and had an episode of diarrhea yesterday evening. Upon assessment appearing very tired however still able to talk in complete sentences. Pt endorsing mild 2/3 generalized chest pain that does not radiate. Denies any current cough, fever N/V/D/SOB/Gi/ symptoms. PMH of GERD. No PSH. VSS

## 2024-12-24 NOTE — ED PROVIDER NOTE - PATIENT PORTAL LINK FT
You can access the FollowMyHealth Patient Portal offered by [facility] by registering at the following website: [email or web address]. By joining [facility]’s FollowMyHealth portal, you will also be able to view your health information using other applications (apps) compatible with our system.

## 2024-12-25 LAB
CULTURE RESULTS: SIGNIFICANT CHANGE UP
SPECIMEN SOURCE: SIGNIFICANT CHANGE UP